# Patient Record
Sex: MALE | Race: WHITE | NOT HISPANIC OR LATINO | URBAN - METROPOLITAN AREA
[De-identification: names, ages, dates, MRNs, and addresses within clinical notes are randomized per-mention and may not be internally consistent; named-entity substitution may affect disease eponyms.]

---

## 2020-06-05 ENCOUNTER — OFFICE VISIT (OUTPATIENT)
Dept: URBAN - METROPOLITAN AREA TELEHEALTH 2 | Facility: TELEHEALTH | Age: 70
End: 2020-06-05
Payer: MEDICARE

## 2020-06-05 DIAGNOSIS — E11.9 DIABETES MELLITUS: ICD-10-CM

## 2020-06-05 DIAGNOSIS — G47.33 OSA (OBSTRUCTIVE SLEEP APNEA): ICD-10-CM

## 2020-06-05 DIAGNOSIS — Z86.010 PERSONAL HISTORY OF COLONIC POLYPS: ICD-10-CM

## 2020-06-05 DIAGNOSIS — R10.84 GENERALIZED ABDOMINAL PAIN: ICD-10-CM

## 2020-06-05 DIAGNOSIS — N18.9 CKD (CHRONIC KIDNEY DISEASE): ICD-10-CM

## 2020-06-05 DIAGNOSIS — Z79.02 ANTIPLATELET OR ANTITHROMBOTIC LONG-TERM USE: ICD-10-CM

## 2020-06-05 DIAGNOSIS — Z87.11 PERSONAL HISTORY OF PEPTIC ULCER DISEASE: ICD-10-CM

## 2020-06-05 DIAGNOSIS — I25.9 CAD (CORONARY ARTERY DISEASE): ICD-10-CM

## 2020-06-05 PROCEDURE — 3017F COLORECTAL CA SCREEN DOC REV: CPT | Performed by: INTERNAL MEDICINE

## 2020-06-05 PROCEDURE — G9903 PT SCRN TBCO ID AS NON USER: HCPCS | Performed by: INTERNAL MEDICINE

## 2020-06-05 PROCEDURE — G8427 DOCREV CUR MEDS BY ELIG CLIN: HCPCS | Performed by: INTERNAL MEDICINE

## 2020-06-05 PROCEDURE — 99443 PHONE E/M BY PHYS 21-30 MIN: CPT | Performed by: INTERNAL MEDICINE

## 2020-06-05 PROCEDURE — 1036F TOBACCO NON-USER: CPT | Performed by: INTERNAL MEDICINE

## 2020-06-05 PROCEDURE — G8417 CALC BMI ABV UP PARAM F/U: HCPCS | Performed by: INTERNAL MEDICINE

## 2020-06-05 RX ORDER — NITROGLYCERIN 0.4 MG/1
TABLET SUBLINGUAL
Qty: 0 | Refills: 0 | Status: ACTIVE | COMMUNITY
Start: 1900-01-01

## 2020-06-05 RX ORDER — POTASSIUM CHLORIDE 750 MG/1
TAKE 1 TABLET BY ORAL ROUTE 2 TIMES A DAY TABLET, EXTENDED RELEASE ORAL 2
Qty: 0 | Refills: 0 | Status: ACTIVE | COMMUNITY
Start: 1900-01-01

## 2020-06-05 RX ORDER — RANOLAZINE 500 MG/1
TAKE 1 TABLET (500 MG) BY ORAL ROUTE 2 TIMES PER DAY TABLET, FILM COATED, EXTENDED RELEASE ORAL 2
Qty: 0 | Refills: 0 | Status: ACTIVE | COMMUNITY
Start: 1900-01-01

## 2020-06-05 RX ORDER — INSULIN ASPART 100 [IU]/ML
INJECT 30 UNITS BY SUBCUTANEOUS ROUTE ONCE INJECTION, SOLUTION INTRAVENOUS; SUBCUTANEOUS 1
Qty: 1 | Refills: 0 | Status: ACTIVE | COMMUNITY
Start: 1900-01-01

## 2020-06-05 RX ORDER — TORSEMIDE 100 MG/1
TAKE 2 TABLETS (200 MG) BY ORAL ROUTE ONCE DAILY TABLET ORAL 1
Qty: 0 | Refills: 0 | Status: ACTIVE | COMMUNITY
Start: 1900-01-01

## 2020-06-05 RX ORDER — CLOPIDOGREL 75 MG/1
TAKE 1 TABLET (75 MG) BY ORAL ROUTE ONCE DAILY TABLET ORAL 1
Qty: 0 | Refills: 0 | Status: ACTIVE | COMMUNITY
Start: 1900-01-01

## 2020-06-05 RX ORDER — FAMOTIDINE 20 MG/1
TAKE 1 TABLET (20 MG) BY ORAL ROUTE EVERY 12 HOURS TABLET ORAL 2
Qty: 0 | Refills: 0 | Status: ACTIVE | COMMUNITY
Start: 1900-01-01

## 2020-06-05 RX ORDER — GABAPENTIN 100 MG/1
TAKE 3 CAPSULES (300 MG) BY ORAL ROUTE ONCE DAILY AT BEDTIME CAPSULE ORAL 1
Qty: 0 | Refills: 0 | Status: ACTIVE | COMMUNITY
Start: 1900-01-01

## 2020-06-05 RX ORDER — METOPROLOL TARTRATE 25 MG/1
TAKE 1 TABLET (25 MG) BY ORAL ROUTE 2 TIMES PER DAY TABLET, FILM COATED ORAL 2
Qty: 0 | Refills: 0 | Status: ACTIVE | COMMUNITY
Start: 1900-01-01

## 2020-06-05 RX ORDER — POLYETHYLENE GLYOCOL 3350, SODIUM CHLORIDE, SODIUM BICARBONATE AND POTASSIUM CHLORIDE 420; 11.2; 5.72; 1.48 G/4L; G/4L; G/4L; G/4L
AS DIRECTED POWDER, FOR SOLUTION NASOGASTRIC; ORAL ONCE
Qty: 1 BOTTLE | Refills: 0 | OUTPATIENT
Start: 2020-06-05

## 2020-06-05 RX ORDER — ISOSORBIDE MONONITRATE 120 MG/1
TAKE 1 TABLET (120 MG) BY ORAL ROUTE ONCE DAILY IN THE MORNING TABLET, EXTENDED RELEASE ORAL 1
Qty: 0 | Refills: 0 | Status: ACTIVE | COMMUNITY
Start: 1900-01-01

## 2020-06-05 RX ORDER — FEBUXOSTAT 40 MG/1
TABLET ORAL
Qty: 0 | Refills: 0 | Status: ACTIVE | COMMUNITY
Start: 1900-01-01

## 2020-06-05 RX ORDER — SEVELAMER CARBONATE 800 MG/1
TABLET, FILM COATED ORAL
Qty: 0 | Refills: 0 | Status: ACTIVE | COMMUNITY
Start: 1900-01-01

## 2020-06-05 RX ORDER — LISINOPRIL 20 MG/1
TAKE 1 TABLET (20 MG) BY ORAL ROUTE ONCE DAILY TABLET ORAL 1
Qty: 0 | Refills: 0 | Status: ACTIVE | COMMUNITY
Start: 1900-01-01

## 2020-06-05 RX ORDER — BISACODYL 5 MG
2 TABLETS TABLET, DELAYED RELEASE (ENTERIC COATED) ORAL
Qty: 4 TABLET | Refills: 0 | OUTPATIENT
Start: 2020-06-05

## 2020-06-05 RX ORDER — INSULIN DETEMIR 100 [IU]/ML
INJECT BY SUBCUTANEOUS ROUTE PER PRESCRIBER'S INSTRUCTIONS. INSULIN DOSING REQUIRES INDIVIDUALIZATION INJECTION, SOLUTION SUBCUTANEOUS
Qty: 1 | Refills: 0 | Status: ACTIVE | COMMUNITY
Start: 1900-01-01

## 2020-06-05 RX ORDER — ATORVASTATIN CALCIUM 40 MG/1
TAKE 1 TABLET (40 MG) BY ORAL ROUTE ONCE DAILY TABLET, FILM COATED ORAL 1
Qty: 0 | Refills: 0 | Status: ACTIVE | COMMUNITY
Start: 1900-01-01

## 2020-06-05 RX ORDER — FENOFIBRATE 160 MG/1
TAKE 1 TABLET (160 MG) BY ORAL ROUTE ONCE DAILY TABLET ORAL 1
Qty: 0 | Refills: 0 | Status: ACTIVE | COMMUNITY
Start: 1900-01-01

## 2020-06-05 NOTE — HPI-TODAY'S VISIT:
2020 - telemed visit. went to ER again with abdominal pain this week. had another CT which was normal. pain is in LLQ. intermittent.    CT abdomen pelvis without contrast May 18, 2020 revealed fatty liver, no biliary ductal dilation, absent gallbladder, no pancreatic mass, no acute process, occasional colonic diverticulosis without diverticulitis, no ascites, no lymphadenopathy. More than half of the face-to-face time used for counseling and coordination of care. Patient seen today via telehealth by agreement and consent of patient in light of current COVID-19 pandemic. I used a telephone call during the visit. The patient encounter is appropriate and reasonable under the circumstances given the patient's particular presentation at this time. The patient has been advised of the followin) the potential risks and limitations of this mode of treatment (including but not limited to the absence of in-person examination); 2) the right to refuse telehealth services at any point without affecting the right to future care; 3) the right to receive in-person services, included immediately after this consultation if an urgent need arises; 4) information, including identifiable images or information from this telehealth consult, will only be shared in accordance with HIPAA regulations. Any and all of the patient's and/or patient's family member's questions on this issue have been answered. The patient has verbally consented to be treated via telehealth services. The patient has also been advised to contact this office for worsening conditions or problems, and seek emergency medical treatment and/or call 911 if the patient deems either necessary. More than half of the face-to-face time used for counseling and coordination of care.

## 2020-06-05 NOTE — HPI-OTHER HISTORIES
May 2020 -  stage 4 CKD. left lower abdominal pain X 4 days, sharp, intermittent, worse with moevement. regular bowels. no fever. EGD July 2019 : Mild antral gastritis, pending gastric biopsies. Path revealed no diagnostic abnormality. No H. pylori Duplex evaluationof mesenteric arterial structures June 2019 : No evidence of hemodynamically significant stenosis of the celiac, superior mesenteric or inferior mesenteric artery. CT a/p April 2019 : Colonic diverticulosis without evidence of diverticulitis. Normal appendix. No focal inflammatory process. Fatty infiltration of the liver. Prior cholecystectomy. Gynecomasta. Labs April 2019 : CR 2.5, T BILI 0.7, AST 78, ALT 36, ALP 49, WBC 7.1, HB 12.9, . May 2019 - colonoscopy in may 2018 revealed 4 colonic polyps ( cecum - TA, AC- TA, DC-TA, sigmoid - hyperplastic) Repeat procedure advised in 3 years. Was on plavix at that time. 2 months ago - started having abdominal pain - sharp , left sided. went to [facility] - was told he had abdominal hernia. CT scan was done by pcp . Regular bowels with fiber. no melena or rectal bleeding. Pain is intermittent. no particular triggers. even occurs at night time. CT A/P without iv contrast April 2019 - colonic diverticulosis. fatty liver. absent GB. BUn 63, Cr 2.4, Hb 14, Plt 163. normal lipase. normal LFTs. Nov 2017 Patient's family history is unknown. He is on Aspirin 81 mg as well as Plavix. He is also presently on Pepcid. History of peptic ulcer disease in 2004. Most recent PCI 2 years ago. History of CAD s/p PCI, CKD, DM on insulin, LOGAN on bipap, EF 65% Colonoscopy in 2008 was normal.

## 2020-06-09 ENCOUNTER — TELEPHONE ENCOUNTER (OUTPATIENT)
Dept: URBAN - METROPOLITAN AREA CLINIC 23 | Facility: CLINIC | Age: 70
End: 2020-06-09

## 2020-06-09 ENCOUNTER — TELEPHONE ENCOUNTER (OUTPATIENT)
Dept: URBAN - METROPOLITAN AREA CLINIC 84 | Facility: CLINIC | Age: 70
End: 2020-06-09

## 2020-06-11 ENCOUNTER — CLAIMS CREATED FROM THE CLAIM WINDOW (OUTPATIENT)
Dept: URBAN - METROPOLITAN AREA MEDICAL CENTER 17 | Facility: MEDICAL CENTER | Age: 70
End: 2020-06-11
Payer: MEDICARE

## 2020-06-11 ENCOUNTER — OFFICE VISIT (OUTPATIENT)
Dept: URBAN - METROPOLITAN AREA CLINIC 84 | Facility: CLINIC | Age: 70
End: 2020-06-11

## 2020-06-11 ENCOUNTER — OFFICE VISIT (OUTPATIENT)
Dept: URBAN - METROPOLITAN AREA MEDICAL CENTER 17 | Facility: MEDICAL CENTER | Age: 70
End: 2020-06-11

## 2020-06-11 ENCOUNTER — TELEPHONE ENCOUNTER (OUTPATIENT)
Dept: URBAN - METROPOLITAN AREA CLINIC 92 | Facility: CLINIC | Age: 70
End: 2020-06-11

## 2020-06-11 DIAGNOSIS — R10.84 ABDOMINAL CRAMPING, GENERALIZED: ICD-10-CM

## 2020-06-11 PROCEDURE — G9937 DIG OR SURV COLSCO: HCPCS | Performed by: INTERNAL MEDICINE

## 2020-06-11 PROCEDURE — 45378 DIAGNOSTIC COLONOSCOPY: CPT | Performed by: INTERNAL MEDICINE

## 2020-06-11 RX ORDER — INSULIN ASPART 100 [IU]/ML
INJECT 30 UNITS BY SUBCUTANEOUS ROUTE ONCE INJECTION, SOLUTION INTRAVENOUS; SUBCUTANEOUS 1
Qty: 1 | Refills: 0 | Status: ACTIVE | COMMUNITY
Start: 1900-01-01

## 2020-06-11 RX ORDER — FEBUXOSTAT 40 MG/1
TABLET ORAL
Qty: 0 | Refills: 0 | Status: ACTIVE | COMMUNITY
Start: 1900-01-01

## 2020-06-11 RX ORDER — POLYETHYLENE GLYOCOL 3350, SODIUM CHLORIDE, SODIUM BICARBONATE AND POTASSIUM CHLORIDE 420; 11.2; 5.72; 1.48 G/4L; G/4L; G/4L; G/4L
AS DIRECTED POWDER, FOR SOLUTION NASOGASTRIC; ORAL ONCE
Qty: 1 BOTTLE | Refills: 0 | Status: ACTIVE | COMMUNITY
Start: 2020-06-05

## 2020-06-11 RX ORDER — NITROGLYCERIN 0.4 MG/1
TABLET SUBLINGUAL
Qty: 0 | Refills: 0 | Status: ACTIVE | COMMUNITY
Start: 1900-01-01

## 2020-06-11 RX ORDER — LUBIPROSTONE 8 UG/1
1 CAPSULE WITH FOOD AND WATER CAPSULE, GELATIN COATED ORAL TWICE A DAY
Qty: 180 CAPSULE | Refills: 2 | OUTPATIENT
Start: 2020-06-11 | End: 2021-03-07

## 2020-06-11 RX ORDER — INSULIN DETEMIR 100 [IU]/ML
INJECT BY SUBCUTANEOUS ROUTE PER PRESCRIBER'S INSTRUCTIONS. INSULIN DOSING REQUIRES INDIVIDUALIZATION INJECTION, SOLUTION SUBCUTANEOUS
Qty: 1 | Refills: 0 | Status: ACTIVE | COMMUNITY
Start: 1900-01-01

## 2020-06-11 RX ORDER — TORSEMIDE 100 MG/1
TAKE 2 TABLETS (200 MG) BY ORAL ROUTE ONCE DAILY TABLET ORAL 1
Qty: 0 | Refills: 0 | Status: ACTIVE | COMMUNITY
Start: 1900-01-01

## 2020-06-11 RX ORDER — METOPROLOL TARTRATE 25 MG/1
TAKE 1 TABLET (25 MG) BY ORAL ROUTE 2 TIMES PER DAY TABLET, FILM COATED ORAL 2
Qty: 0 | Refills: 0 | Status: ACTIVE | COMMUNITY
Start: 1900-01-01

## 2020-06-11 RX ORDER — BISACODYL 5 MG
2 TABLETS TABLET, DELAYED RELEASE (ENTERIC COATED) ORAL
Qty: 4 TABLET | Refills: 0 | Status: ACTIVE | COMMUNITY
Start: 2020-06-05

## 2020-06-11 RX ORDER — SEVELAMER CARBONATE 800 MG/1
TABLET, FILM COATED ORAL
Qty: 0 | Refills: 0 | Status: ACTIVE | COMMUNITY
Start: 1900-01-01

## 2020-06-11 RX ORDER — FAMOTIDINE 20 MG/1
TAKE 1 TABLET (20 MG) BY ORAL ROUTE EVERY 12 HOURS TABLET ORAL 2
Qty: 0 | Refills: 0 | Status: ACTIVE | COMMUNITY
Start: 1900-01-01

## 2020-06-11 RX ORDER — RANOLAZINE 500 MG/1
TAKE 1 TABLET (500 MG) BY ORAL ROUTE 2 TIMES PER DAY TABLET, FILM COATED, EXTENDED RELEASE ORAL 2
Qty: 0 | Refills: 0 | Status: ACTIVE | COMMUNITY
Start: 1900-01-01

## 2020-06-11 RX ORDER — ATORVASTATIN CALCIUM 40 MG/1
TAKE 1 TABLET (40 MG) BY ORAL ROUTE ONCE DAILY TABLET, FILM COATED ORAL 1
Qty: 0 | Refills: 0 | Status: ACTIVE | COMMUNITY
Start: 1900-01-01

## 2020-06-11 RX ORDER — FENOFIBRATE 160 MG/1
TAKE 1 TABLET (160 MG) BY ORAL ROUTE ONCE DAILY TABLET ORAL 1
Qty: 0 | Refills: 0 | Status: ACTIVE | COMMUNITY
Start: 1900-01-01

## 2020-06-11 RX ORDER — ISOSORBIDE MONONITRATE 120 MG/1
TAKE 1 TABLET (120 MG) BY ORAL ROUTE ONCE DAILY IN THE MORNING TABLET, EXTENDED RELEASE ORAL 1
Qty: 0 | Refills: 0 | Status: ACTIVE | COMMUNITY
Start: 1900-01-01

## 2020-06-11 RX ORDER — POTASSIUM CHLORIDE 750 MG/1
TAKE 1 TABLET BY ORAL ROUTE 2 TIMES A DAY TABLET, EXTENDED RELEASE ORAL 2
Qty: 0 | Refills: 0 | Status: ACTIVE | COMMUNITY
Start: 1900-01-01

## 2020-06-11 RX ORDER — LISINOPRIL 20 MG/1
TAKE 1 TABLET (20 MG) BY ORAL ROUTE ONCE DAILY TABLET ORAL 1
Qty: 0 | Refills: 0 | Status: ACTIVE | COMMUNITY
Start: 1900-01-01

## 2020-06-11 RX ORDER — CLOPIDOGREL 75 MG/1
TAKE 1 TABLET (75 MG) BY ORAL ROUTE ONCE DAILY TABLET ORAL 1
Qty: 0 | Refills: 0 | Status: ACTIVE | COMMUNITY
Start: 1900-01-01

## 2020-06-11 RX ORDER — GABAPENTIN 100 MG/1
TAKE 3 CAPSULES (300 MG) BY ORAL ROUTE ONCE DAILY AT BEDTIME CAPSULE ORAL 1
Qty: 0 | Refills: 0 | Status: ACTIVE | COMMUNITY
Start: 1900-01-01

## 2020-06-18 ENCOUNTER — OFFICE VISIT (OUTPATIENT)
Dept: URBAN - METROPOLITAN AREA CLINIC 84 | Facility: CLINIC | Age: 70
End: 2020-06-18

## 2020-07-29 ENCOUNTER — OFFICE VISIT (OUTPATIENT)
Dept: URBAN - METROPOLITAN AREA TELEHEALTH 2 | Facility: TELEHEALTH | Age: 70
End: 2020-07-29
Payer: MEDICARE

## 2020-07-29 DIAGNOSIS — K58.9 IBS (IRRITABLE BOWEL SYNDROME): ICD-10-CM

## 2020-07-29 DIAGNOSIS — R10.84 ABDOMINAL CRAMPING, GENERALIZED: ICD-10-CM

## 2020-07-29 DIAGNOSIS — Z86.010 PERSONAL HISTORY OF COLONIC POLYPS: ICD-10-CM

## 2020-07-29 PROBLEM — 428283002 HISTORY OF POLYP OF COLON: Status: ACTIVE | Noted: 2020-06-05

## 2020-07-29 PROBLEM — 285387005 LEFT SIDED ABDOMINAL PAIN: Status: ACTIVE | Noted: 2020-07-28

## 2020-07-29 PROBLEM — 73211009 DIABETES MELLITUS: Status: ACTIVE | Noted: 2020-07-29

## 2020-07-29 PROBLEM — 710814002 LONG-TERM CURRENT USE OF DRUG THERAPY: Status: ACTIVE | Noted: 2020-07-28

## 2020-07-29 PROBLEM — 266998003 HISTORY OF PEPTIC ULCER: Status: ACTIVE | Noted: 2020-07-28

## 2020-07-29 PROBLEM — 709044004 CHRONIC KIDNEY DISEASE: Status: ACTIVE | Noted: 2020-07-28

## 2020-07-29 PROBLEM — 10743008 IRRITABLE BOWEL SYNDROME: Status: ACTIVE | Noted: 2020-07-28

## 2020-07-29 PROBLEM — 53741008 CORONARY ARTERY DISEASE: Status: ACTIVE | Noted: 2020-07-28

## 2020-07-29 PROCEDURE — 3017F COLORECTAL CA SCREEN DOC REV: CPT | Performed by: INTERNAL MEDICINE

## 2020-07-29 PROCEDURE — G8417 CALC BMI ABV UP PARAM F/U: HCPCS | Performed by: INTERNAL MEDICINE

## 2020-07-29 PROCEDURE — 99442 PHONE E/M BY PHYS 11-20 MIN: CPT | Performed by: INTERNAL MEDICINE

## 2020-07-29 PROCEDURE — 1036F TOBACCO NON-USER: CPT | Performed by: INTERNAL MEDICINE

## 2020-07-29 PROCEDURE — G8427 DOCREV CUR MEDS BY ELIG CLIN: HCPCS | Performed by: INTERNAL MEDICINE

## 2020-07-29 PROCEDURE — G9903 PT SCRN TBCO ID AS NON USER: HCPCS | Performed by: INTERNAL MEDICINE

## 2020-07-29 RX ORDER — LUBIPROSTONE 8 UG/1
1 CAPSULE WITH FOOD AND WATER CAPSULE, GELATIN COATED ORAL TWICE A DAY
Qty: 180 CAPSULE | Refills: 2 | Status: DISCONTINUED | COMMUNITY
Start: 2020-06-11 | End: 2021-03-07

## 2020-07-29 RX ORDER — FENOFIBRATE 160 MG/1
TAKE 1 TABLET (160 MG) BY ORAL ROUTE ONCE DAILY TABLET ORAL 1
Qty: 0 | Refills: 0 | Status: ACTIVE | COMMUNITY
Start: 1900-01-01

## 2020-07-29 RX ORDER — ISOSORBIDE MONONITRATE 120 MG/1
TAKE 1 TABLET (120 MG) BY ORAL ROUTE ONCE DAILY IN THE MORNING TABLET, EXTENDED RELEASE ORAL 1
Qty: 0 | Refills: 0 | Status: ACTIVE | COMMUNITY
Start: 1900-01-01

## 2020-07-29 RX ORDER — FAMOTIDINE 20 MG/1
TAKE 1 TABLET (20 MG) BY ORAL ROUTE EVERY 12 HOURS TABLET ORAL 2
Qty: 0 | Refills: 0 | Status: ACTIVE | COMMUNITY
Start: 1900-01-01

## 2020-07-29 RX ORDER — LISINOPRIL 20 MG/1
TAKE 1 TABLET (20 MG) BY ORAL ROUTE ONCE DAILY TABLET ORAL 1
Qty: 0 | Refills: 0 | Status: ACTIVE | COMMUNITY
Start: 1900-01-01

## 2020-07-29 RX ORDER — ATORVASTATIN CALCIUM 40 MG/1
TAKE 1 TABLET (40 MG) BY ORAL ROUTE ONCE DAILY TABLET, FILM COATED ORAL 1
Qty: 0 | Refills: 0 | Status: ACTIVE | COMMUNITY
Start: 1900-01-01

## 2020-07-29 RX ORDER — INSULIN DETEMIR 100 [IU]/ML
INJECT BY SUBCUTANEOUS ROUTE PER PRESCRIBER'S INSTRUCTIONS. INSULIN DOSING REQUIRES INDIVIDUALIZATION INJECTION, SOLUTION SUBCUTANEOUS
Qty: 1 | Refills: 0 | Status: ACTIVE | COMMUNITY
Start: 1900-01-01

## 2020-07-29 RX ORDER — BISACODYL 5 MG
2 TABLETS TABLET, DELAYED RELEASE (ENTERIC COATED) ORAL
Qty: 4 TABLET | Refills: 0 | Status: DISCONTINUED | COMMUNITY
Start: 2020-06-05

## 2020-07-29 RX ORDER — POTASSIUM CHLORIDE 750 MG/1
TAKE 1 TABLET BY ORAL ROUTE 2 TIMES A DAY TABLET, EXTENDED RELEASE ORAL 2
Qty: 0 | Refills: 0 | Status: ACTIVE | COMMUNITY
Start: 1900-01-01

## 2020-07-29 RX ORDER — SEVELAMER CARBONATE 800 MG/1
TABLET, FILM COATED ORAL
Qty: 0 | Refills: 0 | Status: ACTIVE | COMMUNITY
Start: 1900-01-01

## 2020-07-29 RX ORDER — CLOPIDOGREL 75 MG/1
TAKE 1 TABLET (75 MG) BY ORAL ROUTE ONCE DAILY TABLET ORAL 1
Qty: 0 | Refills: 0 | Status: ACTIVE | COMMUNITY
Start: 1900-01-01

## 2020-07-29 RX ORDER — GABAPENTIN 100 MG/1
TAKE 3 CAPSULES (300 MG) BY ORAL ROUTE ONCE DAILY AT BEDTIME CAPSULE ORAL 1
Qty: 0 | Refills: 0 | Status: ACTIVE | COMMUNITY
Start: 1900-01-01

## 2020-07-29 RX ORDER — METOPROLOL TARTRATE 25 MG/1
TAKE 1 TABLET (25 MG) BY ORAL ROUTE 2 TIMES PER DAY TABLET, FILM COATED ORAL 2
Qty: 0 | Refills: 0 | Status: ACTIVE | COMMUNITY
Start: 1900-01-01

## 2020-07-29 RX ORDER — POLYETHYLENE GLYOCOL 3350, SODIUM CHLORIDE, SODIUM BICARBONATE AND POTASSIUM CHLORIDE 420; 11.2; 5.72; 1.48 G/4L; G/4L; G/4L; G/4L
AS DIRECTED POWDER, FOR SOLUTION NASOGASTRIC; ORAL ONCE
Qty: 1 BOTTLE | Refills: 0 | Status: DISCONTINUED | COMMUNITY
Start: 2020-06-05

## 2020-07-29 RX ORDER — NITROGLYCERIN 0.4 MG/1
TABLET SUBLINGUAL
Qty: 0 | Refills: 0 | Status: ACTIVE | COMMUNITY
Start: 1900-01-01

## 2020-07-29 RX ORDER — TORSEMIDE 100 MG/1
TAKE 2 TABLETS (200 MG) BY ORAL ROUTE ONCE DAILY TABLET ORAL 1
Qty: 0 | Refills: 0 | Status: ACTIVE | COMMUNITY
Start: 1900-01-01

## 2020-07-29 RX ORDER — INSULIN ASPART 100 [IU]/ML
INJECT 30 UNITS BY SUBCUTANEOUS ROUTE ONCE INJECTION, SOLUTION INTRAVENOUS; SUBCUTANEOUS 1
Qty: 1 | Refills: 0 | Status: ACTIVE | COMMUNITY
Start: 1900-01-01

## 2020-07-29 RX ORDER — FEBUXOSTAT 40 MG/1
TABLET ORAL
Qty: 0 | Refills: 0 | Status: ACTIVE | COMMUNITY
Start: 1900-01-01

## 2020-07-29 RX ORDER — RANOLAZINE 500 MG/1
TAKE 1 TABLET (500 MG) BY ORAL ROUTE 2 TIMES PER DAY TABLET, FILM COATED, EXTENDED RELEASE ORAL 2
Qty: 0 | Refills: 0 | Status: ACTIVE | COMMUNITY
Start: 1900-01-01

## 2020-07-29 NOTE — HPI-TODAY'S VISIT:
July 2020 : telephone visit. no symptoms. constipation improved. stopped amitiza after using it for 2 weeks.  regular bowels. no rectal bleeding.   Colonoscopy June 2020 suboptimal prep, scattered segments of the colon had firmly adherent brown stools however no large polyps or mucosal lesions to explain patient's symptoms.  No obvious diverticulosis.  Small nonbleeding internal hemorrhoids.  CT scan of the abdomen and pelvis without contrast in May 2020 revealed fatty liver, otherwise no acute intra-abdominal process.

## 2020-07-29 NOTE — HPI-OTHER HISTORIES
June 2020 - telemed visit. went to ER again with abdominal pain this week. had another CT which was normal. pain is in LLQ. intermittent.  CT abdomen pelvis without contrast May 18, 2020 revealed fatty liver, no biliary ductal dilation, absent gallbladder, no pancreatic mass, no acute process, occasional colonic diverticulosis without diverticulitis, no ascites, no lymphadenopathy.  May 2020 -  stage 4 CKD. left lower abdominal pain X 4 days, sharp, intermittent, worse with moevement. regular bowels. no fever. EGD July 2019 : Mild antral gastritis, pending gastric biopsies. Path revealed no diagnostic abnormality. No H. pylori Duplex evaluationof mesenteric arterial structures June 2019 : No evidence of hemodynamically significant stenosis of the celiac, superior mesenteric or inferior mesenteric artery. CT a/p April 2019 : Colonic diverticulosis without evidence of diverticulitis. Normal appendix. No focal inflammatory process. Fatty infiltration of the liver. Prior cholecystectomy. Gynecomasta. Labs April 2019 : CR 2.5, T BILI 0.7, AST 78, ALT 36, ALP 49, WBC 7.1, HB 12.9, . May 2019 - colonoscopy in may 2018 revealed 4 colonic polyps ( cecum - TA, AC- TA, DC-TA, sigmoid - hyperplastic) Repeat procedure advised in 3 years. Was on plavix at that time. 2 months ago - started having abdominal pain - sharp , left sided. went to [facility] - was told he had abdominal hernia. CT scan was done by pcp . Regular bowels with fiber. no melena or rectal bleeding. Pain is intermittent. no particular triggers. even occurs at night time. CT A/P without iv contrast April 2019 - colonic diverticulosis. fatty liver. absent GB. BUn 63, Cr 2.4, Hb 14, Plt 163. normal lipase. normal LFTs. Nov 2017 Patient's family history is unknown. He is on Aspirin 81 mg as well as Plavix. He is also presently on Pepcid. History of peptic ulcer disease in 2004. Most recent PCI 2 years ago. History of CAD s/p PCI, CKD, DM on insulin, LOGAN on bipap, EF 65% Colonoscopy in 2008 was normal.

## 2020-10-28 ENCOUNTER — TELEPHONE ENCOUNTER (OUTPATIENT)
Dept: URBAN - METROPOLITAN AREA CLINIC 92 | Facility: CLINIC | Age: 70
End: 2020-10-28

## 2020-11-11 ENCOUNTER — OFFICE VISIT (OUTPATIENT)
Dept: URBAN - METROPOLITAN AREA TELEHEALTH 2 | Facility: TELEHEALTH | Age: 70
End: 2020-11-11

## 2020-11-13 ENCOUNTER — DASHBOARD ENCOUNTERS (OUTPATIENT)
Age: 70
End: 2020-11-13

## 2020-11-13 ENCOUNTER — OFFICE VISIT (OUTPATIENT)
Dept: URBAN - METROPOLITAN AREA TELEHEALTH 2 | Facility: TELEHEALTH | Age: 70
End: 2020-11-13

## 2020-11-13 RX ORDER — SEVELAMER CARBONATE 800 MG/1
TABLET, FILM COATED ORAL
Qty: 0 | Refills: 0 | Status: ACTIVE | COMMUNITY
Start: 1900-01-01

## 2020-11-13 RX ORDER — INSULIN ASPART 100 [IU]/ML
INJECT 30 UNITS BY SUBCUTANEOUS ROUTE ONCE INJECTION, SOLUTION INTRAVENOUS; SUBCUTANEOUS 1
Qty: 1 | Refills: 0 | Status: ACTIVE | COMMUNITY
Start: 1900-01-01

## 2020-11-13 RX ORDER — LISINOPRIL 20 MG/1
TAKE 1 TABLET (20 MG) BY ORAL ROUTE ONCE DAILY TABLET ORAL 1
Qty: 0 | Refills: 0 | Status: ACTIVE | COMMUNITY
Start: 1900-01-01

## 2020-11-13 RX ORDER — FENOFIBRATE 160 MG/1
TAKE 1 TABLET (160 MG) BY ORAL ROUTE ONCE DAILY TABLET ORAL 1
Qty: 0 | Refills: 0 | Status: ACTIVE | COMMUNITY
Start: 1900-01-01

## 2020-11-13 RX ORDER — RANOLAZINE 500 MG/1
TAKE 1 TABLET (500 MG) BY ORAL ROUTE 2 TIMES PER DAY TABLET, FILM COATED, EXTENDED RELEASE ORAL 2
Qty: 0 | Refills: 0 | Status: ACTIVE | COMMUNITY
Start: 1900-01-01

## 2020-11-13 RX ORDER — NITROGLYCERIN 0.4 MG/1
TABLET SUBLINGUAL
Qty: 0 | Refills: 0 | Status: ACTIVE | COMMUNITY
Start: 1900-01-01

## 2020-11-13 RX ORDER — GABAPENTIN 100 MG/1
TAKE 3 CAPSULES (300 MG) BY ORAL ROUTE ONCE DAILY AT BEDTIME CAPSULE ORAL 1
Qty: 0 | Refills: 0 | Status: ACTIVE | COMMUNITY
Start: 1900-01-01

## 2020-11-13 RX ORDER — METOPROLOL TARTRATE 25 MG/1
TAKE 1 TABLET (25 MG) BY ORAL ROUTE 2 TIMES PER DAY TABLET, FILM COATED ORAL 2
Qty: 0 | Refills: 0 | Status: ACTIVE | COMMUNITY
Start: 1900-01-01

## 2020-11-13 RX ORDER — ATORVASTATIN CALCIUM 40 MG/1
TAKE 1 TABLET (40 MG) BY ORAL ROUTE ONCE DAILY TABLET, FILM COATED ORAL 1
Qty: 0 | Refills: 0 | Status: ACTIVE | COMMUNITY
Start: 1900-01-01

## 2020-11-13 RX ORDER — CLOPIDOGREL 75 MG/1
TAKE 1 TABLET (75 MG) BY ORAL ROUTE ONCE DAILY TABLET ORAL 1
Qty: 0 | Refills: 0 | Status: ACTIVE | COMMUNITY
Start: 1900-01-01

## 2020-11-13 RX ORDER — ISOSORBIDE MONONITRATE 120 MG/1
TAKE 1 TABLET (120 MG) BY ORAL ROUTE ONCE DAILY IN THE MORNING TABLET, EXTENDED RELEASE ORAL 1
Qty: 0 | Refills: 0 | Status: ACTIVE | COMMUNITY
Start: 1900-01-01

## 2020-11-13 RX ORDER — FAMOTIDINE 20 MG/1
TAKE 1 TABLET (20 MG) BY ORAL ROUTE EVERY 12 HOURS TABLET ORAL 2
Qty: 0 | Refills: 0 | Status: ACTIVE | COMMUNITY
Start: 1900-01-01

## 2020-11-13 RX ORDER — POTASSIUM CHLORIDE 750 MG/1
TAKE 1 TABLET BY ORAL ROUTE 2 TIMES A DAY TABLET, EXTENDED RELEASE ORAL 2
Qty: 0 | Refills: 0 | Status: ACTIVE | COMMUNITY
Start: 1900-01-01

## 2020-11-13 RX ORDER — FEBUXOSTAT 40 MG/1
TABLET ORAL
Qty: 0 | Refills: 0 | Status: ACTIVE | COMMUNITY
Start: 1900-01-01

## 2020-11-13 RX ORDER — TORSEMIDE 100 MG/1
TAKE 2 TABLETS (200 MG) BY ORAL ROUTE ONCE DAILY TABLET ORAL 1
Qty: 0 | Refills: 0 | Status: ACTIVE | COMMUNITY
Start: 1900-01-01

## 2020-11-13 RX ORDER — INSULIN DETEMIR 100 [IU]/ML
INJECT BY SUBCUTANEOUS ROUTE PER PRESCRIBER'S INSTRUCTIONS. INSULIN DOSING REQUIRES INDIVIDUALIZATION INJECTION, SOLUTION SUBCUTANEOUS
Qty: 1 | Refills: 0 | Status: ACTIVE | COMMUNITY
Start: 1900-01-01

## 2020-11-13 NOTE — HPI-OTHER HISTORIES
July 2020 : telephone visit. no symptoms. constipation improved. stopped amitiza after using it for 2 weeks.  regular bowels. no rectal bleeding.   Colonoscopy June 2020 suboptimal prep, scattered segments of the colon had firmly adherent brown stools however no large polyps or mucosal lesions to explain patient's symptoms.  No obvious diverticulosis.  Small nonbleeding internal hemorrhoids.  CT scan of the abdomen and pelvis without contrast in May 2020 revealed fatty liver, otherwise no acute intra-abdominal process.    June 2020 - telemed visit. went to ER again with abdominal pain this week. had another CT which was normal. pain is in LLQ. intermittent.  CT abdomen pelvis without contrast May 18, 2020 revealed fatty liver, no biliary ductal dilation, absent gallbladder, no pancreatic mass, no acute process, occasional colonic diverticulosis without diverticulitis, no ascites, no lymphadenopathy.  May 2020 -  stage 4 CKD. left lower abdominal pain X 4 days, sharp, intermittent, worse with moevement. regular bowels. no fever. EGD July 2019 : Mild antral gastritis, pending gastric biopsies. Path revealed no diagnostic abnormality. No H. pylori Duplex evaluationof mesenteric arterial structures June 2019 : No evidence of hemodynamically significant stenosis of the celiac, superior mesenteric or inferior mesenteric artery. CT a/p April 2019 : Colonic diverticulosis without evidence of diverticulitis. Normal appendix. No focal inflammatory process. Fatty infiltration of the liver. Prior cholecystectomy. Gynecomasta. Labs April 2019 : CR 2.5, T BILI 0.7, AST 78, ALT 36, ALP 49, WBC 7.1, HB 12.9, . May 2019 - colonoscopy in may 2018 revealed 4 colonic polyps ( cecum - TA, AC- TA, DC-TA, sigmoid - hyperplastic) Repeat procedure advised in 3 years. Was on plavix at that time. 2 months ago - started having abdominal pain - sharp , left sided. went to [facility] - was told he had abdominal hernia. CT scan was done by pcp . Regular bowels with fiber. no melena or rectal bleeding. Pain is intermittent. no particular triggers. even occurs at night time. CT A/P without iv contrast April 2019 - colonic diverticulosis. fatty liver. absent GB. BUn 63, Cr 2.4, Hb 14, Plt 163. normal lipase. normal LFTs. Nov 2017 Patient's family history is unknown. He is on Aspirin 81 mg as well as Plavix. He is also presently on Pepcid. History of peptic ulcer disease in 2004. Most recent PCI 2 years ago. History of CAD s/p PCI, CKD, DM on insulin, LOGAN on bipap, EF 65% Colonoscopy in 2008 was normal.

## 2024-01-01 NOTE — PHYSICAL EXAM CONSTITUTIONAL:
pleasant, well nourished, well developed, in no acute distress , normal communication ability Statement Selected